# Patient Record
Sex: MALE | Race: BLACK OR AFRICAN AMERICAN | Employment: PART TIME | URBAN - METROPOLITAN AREA
[De-identification: names, ages, dates, MRNs, and addresses within clinical notes are randomized per-mention and may not be internally consistent; named-entity substitution may affect disease eponyms.]

---

## 2017-04-10 ENCOUNTER — OFFICE VISIT (OUTPATIENT)
Dept: FAMILY MEDICINE | Facility: CLINIC | Age: 52
End: 2017-04-10
Payer: COMMERCIAL

## 2017-04-10 ENCOUNTER — TELEPHONE (OUTPATIENT)
Dept: FAMILY MEDICINE | Facility: CLINIC | Age: 52
End: 2017-04-10

## 2017-04-10 VITALS
HEIGHT: 74 IN | TEMPERATURE: 97.3 F | OXYGEN SATURATION: 99 % | HEART RATE: 62 BPM | WEIGHT: 207.5 LBS | RESPIRATION RATE: 16 BRPM | SYSTOLIC BLOOD PRESSURE: 116 MMHG | BODY MASS INDEX: 26.63 KG/M2 | DIASTOLIC BLOOD PRESSURE: 62 MMHG

## 2017-04-10 DIAGNOSIS — G44.201 INTRACTABLE TENSION-TYPE HEADACHE, UNSPECIFIED CHRONICITY PATTERN: Chronic | ICD-10-CM

## 2017-04-10 DIAGNOSIS — M77.11 LATERAL EPICONDYLITIS OF RIGHT ELBOW: ICD-10-CM

## 2017-04-10 DIAGNOSIS — G89.29 CHRONIC BILATERAL LOW BACK PAIN WITH LEFT-SIDED SCIATICA: ICD-10-CM

## 2017-04-10 DIAGNOSIS — L84 CORNS AND CALLOSITIES: Primary | ICD-10-CM

## 2017-04-10 DIAGNOSIS — M54.2 NECK PAIN: ICD-10-CM

## 2017-04-10 DIAGNOSIS — M17.0 PRIMARY OSTEOARTHRITIS OF BOTH KNEES: ICD-10-CM

## 2017-04-10 DIAGNOSIS — M54.42 CHRONIC BILATERAL LOW BACK PAIN WITH LEFT-SIDED SCIATICA: ICD-10-CM

## 2017-04-10 PROCEDURE — 99214 OFFICE O/P EST MOD 30 MIN: CPT | Performed by: FAMILY MEDICINE

## 2017-04-10 RX ORDER — SUMATRIPTAN 100 MG/1
100 TABLET, FILM COATED ORAL
Qty: 12 TABLET | Refills: 3 | Status: SHIPPED | OUTPATIENT
Start: 2017-04-10 | End: 2019-07-26

## 2017-04-10 RX ORDER — AMITRIPTYLINE HYDROCHLORIDE 10 MG/1
TABLET ORAL
Qty: 90 TABLET | Refills: 3 | Status: SHIPPED | OUTPATIENT
Start: 2017-04-10 | End: 2019-07-26

## 2017-04-10 NOTE — MR AVS SNAPSHOT
After Visit Summary   4/10/2017    Marcell Kat    MRN: [id number]           Patient Information     Date Of Birth          1965        Visit Information        Provider Department      4/10/2017 7:30 AM Laurent Olivas MD [facility]        Today's Diagnoses     Corns and callosities    -  1    Primary osteoarthritis of both knees        Neck pain        Chronic bilateral low back pain with left-sided sciatica        Intractable tension-type headache, unspecified chronicity pattern          Care Instructions    (L84) Corns and callosities  (primary encounter diagnosis)  Comment:    Plan: order for DME             (M17.0) Primary osteoarthritis of both knees  Comment:      Plan:  KNEE EXERCISES        ICE TO KNEE 5 MINUTES PRIOR TO EXERCISE IF POSSIBLE    ISOMETRIC KNEE EXTENDED POSITION STANDING X 30 TWICE DAILY \    FLEXION OF KNEE ISOMETRIC 90 DEGREE FLEXION X 30 TWICE DAILY    WITH FIVE POUND WEIGHTS OR PURSE    SITTING EXTENDED KNEE  X 3O SECONDS TWICE DAILY    STANDING WITH KNEE FLEXED X 30 SECONDS TWICE DAILY    CLOSED CHAIN EXERCISE  ,THAT IS ONE 1-2 INCH BOOK 30 REPS ON AND OFF THE BOOK OR PLATFORM     AFTER 2 WEEK INCREASE TO 3 INCHES    AFTER 4 WEEKS INCREASE TO 4 INCHES    WALL SITTING 30 SECONDS 45 DEGREES    AFTER 2 WEEKS 30 SECONDS 60 DEGREES    AFTER  4 WEEKS 30 SECONDS 90 DEGREES    BALANCE 30 SECONDS WITH OPPOSITE LEG AT 30 DEGREES AND ARM TO SIDE X 2 WEEKS    BALANCE 30 SECONDS WITH OPPOSITE LEG AT 60 DEGREES AND ARM TO SIDE X 2 WEEKS    REPEAT with OPPOSITE LEGS BALANCING       A     (M54.2) Neck pain  Comment:    Plan:      (M54.42,  G89.29) Chronic bilateral low back pain with left-sided sciatica  Comment:    Plan:      (G44.201) Intractable tension-type headache, unspecified chronicity pattern  Comment:    Plan: SUMAtriptan (IMITREX) 100 MG tablet,         amitriptyline (ELAVIL) 10 MG tablet\    Right lateral epicondylitis   Use  tennis elbow strap daily     ICE MASSAGE 5 MINUTES TWICE DAILY AS NEEDED FOR PAIN CONTROL    ISOMETRIC EXERCISES AT 90 DEGREES 30 SECONDS EACH TWICE DAILY    LATERAL FRICTION MASSAGE OR ELBOW GENTLY 30 SECONDS TWICE DAILY    RANGE OF MOTION OF ELBOW FLEXION AND EXTENSION AS TOLERATED    WITH AND WITHOUT WEIGHTS    PALM DOWN ISOMETRIC FOLLOWED BY PALM DOWN RANGE OF MOTION with AND  WITHOUT RESISTANCE 30 EACH TWICE DAILY    THUMB UP ISOMETRICS FOLLOWED BY THUMB UP RANGE OF MOTION WITH AND WITHOUT RESISTANCE TWICE DAILY 30 EACH    PALM DOWN ISOMETRICS FOLLOWED BY PALM DOWN RANGE OF MOTION WITH AND WITHOUT RESISTANCE TWICE DAILY 30 EACH    INTERNAL  AND EXTERNAL ROTATION OF ELBOW AT 90 DEGREES AS TOLERATED 30 EACH TWICE DAILY    WEIGHT ON A STRING WITH BOTH ARMS ON HAND WIND UP ;AND  WIND DOWN SLOWLY 30 SECONDS EACH TWICE DAILY    MASSAGE LIDOCAINE OINTMENT OR DICLOFENAC GEL 1% TWICE DAILY  AS TOLERATED    TENNIS ELBOW BAND AS TOLERATED WHEN WORKING OR IF HAVING SIGNIFICANT PAIN    aspercreme 10% qid as needed     Laurent Olivas Jr., MD                         Follow-ups after your visit        Follow-up notes from your care team     Return in about 4 weeks (around 5/8/2017).      Who to contact     If you have questions or need follow up information about today's clinic visit or your schedule please contact [facility] directly at [phone].  Normal or non-critical lab and imaging results will be communicated to you by MyChart, letter or phone within 4 business days after the clinic has received the results. If you do not hear from us within 7 days, please contact the clinic through MyChart or phone. If you have a critical or abnormal lab result, we will notify you by phone as soon as possible.  Submit refill requests through [facility] or call your pharmacy and they will forward the refill request to us. Please allow 3 business days for your refill to be completed.           "Additional Information About Your Visit        MyChart Information     [employer] gives you secure access to your electronic health record. If you see a primary care provider, you can also send messages to your care team and make appointments. If you have questions, please call your primary care clinic.  If you do not have a primary care provider, please call [phone] and they will assist you.        Care EveryWhere ID     This is your Care EveryWhere ID. This could be used by other organizations to access your [facility] [facility] medical records  [id number]        Your Vitals Were     Pulse Temperature Respirations Height Pulse Oximetry BMI (Body Mass Index)    62 97.3  F (36.3  C) (Tympanic) 16 6' 1.5\" (1.867 m) 99% 27.01 kg/m2       Blood Pressure from Last 3 Encounters:   04/10/17 116/62   10/14/16 124/76   08/25/14 100/70    Weight from Last 3 Encounters:   04/10/17 207 lb 8 oz (94.1 kg)   10/14/16 201 lb 12.8 oz (91.5 kg)   08/25/14 197 lb (89.4 kg)              We Performed the Following     MIGRAINE ACTION PLAN          Today's Medication Changes          These changes are accurate as of: 4/10/17  8:06 AM.  If you have any questions, ask your nurse or doctor.               Start taking these medicines.        Dose/Directions    amitriptyline 10 MG tablet   Commonly known as:  ELAVIL   Used for:  Intractable tension-type headache, unspecified chronicity pattern   Started by:  Laurent Olivas MD        Start at 1 tab at bedtime for 3 days, then 2 tabs at bedtime for 3 days, then 3 tabs at bedtime.  Plan to increase dose to 50-75 mg at bedtime after 1 month   Quantity:  90 tablet   Refills:  3       order for DME   Used for:  Corns and callosities   Started by:  Laurent Olivas MD        Equipment being ordered:  Tennis elbow brace *one Use as needed  Toe separator One Use as needed   Quantity:  2 each   Refills:  3       SUMAtriptan 100 MG tablet   Commonly known as:  IMITREX   Used for:  Intractable " tension-type headache, unspecified chronicity pattern   Started by:  Laurent Olivas MD        Dose:  100 mg   Take 1 tablet (100 mg) by mouth at onset of headache for migraine   Quantity:  12 tablet   Refills:  3            Where to get your medicines      These medications were sent to [facility] [address] [place], [place] - [address]  [address] [address]     Phone:  [phone]     SUMAtriptan 100 MG tablet         Some of these will need a paper prescription and others can be bought over the counter.  Ask your nurse if you have questions.     Bring a paper prescription for each of these medications     amitriptyline 10 MG tablet    order for DME                Primary Care Provider Office Phone # Fax #    Laurent Olivas [phone]       [facility] [address]  [place] [address]        Thank you!     Thank you for choosing [facility]  for your care. Our goal is always to provide you with excellent care. Hearing back from our patients is one way we can continue to improve our services. Please take a few minutes to complete the written survey that you may receive in the mail after your visit with us. Thank you!             Your Updated Medication List - Protect others around you: Learn how to safely use, store and throw away your medicines at www.disposemymeds.org.          This list is accurate as of: 4/10/17  8:06 AM.  Always use your most recent med list.                   Brand Name Dispense Instructions for use    amitriptyline 10 MG tablet    ELAVIL    90 tablet    Start at 1 tab at bedtime for 3 days, then 2 tabs at bedtime for 3 days, then 3 tabs at bedtime.  Plan to increase dose to 50-75 mg at bedtime after 1 month       lidocaine 5 % ointment    XYLOCAINE    142 g    One application 4 x daily to affected areas lower back and knees if necessary       meclizine 25 MG tablet     ANTIVERT    30 tablet    Take 1 tablet (25 mg) by mouth every 6 hours as needed for dizziness       nadolol 20 MG tablet    CORGARD    30 tablet    Take 1 tablet (20 mg) by mouth daily       naproxen sodium 550 MG tablet    ANAPROX    60 tablet    Take 1 tablet (550 mg) by mouth 2 times daily (with meals)       order for DME     2 each    Equipment being ordered:  Tennis elbow brace *one Use as needed  Toe separator One Use as needed       SUMAtriptan 100 MG tablet    IMITREX    12 tablet    Take 1 tablet (100 mg) by mouth at onset of headache for migraine

## 2017-04-10 NOTE — PATIENT INSTRUCTIONS
(L84) Corns and callosities  (primary encounter diagnosis)  Comment:    Plan: order for DME             (M17.0) Primary osteoarthritis of both knees  Comment:      Plan:  KNEE EXERCISES        ICE TO KNEE 5 MINUTES PRIOR TO EXERCISE IF POSSIBLE    ISOMETRIC KNEE EXTENDED POSITION STANDING X 30 TWICE DAILY \    FLEXION OF KNEE ISOMETRIC 90 DEGREE FLEXION X 30 TWICE DAILY    WITH FIVE POUND WEIGHTS OR PURSE    SITTING EXTENDED KNEE  X 3O SECONDS TWICE DAILY    STANDING WITH KNEE FLEXED X 30 SECONDS TWICE DAILY    CLOSED CHAIN EXERCISE  ,THAT IS ONE 1-2 INCH BOOK 30 REPS ON AND OFF THE BOOK OR PLATFORM     AFTER 2 WEEK INCREASE TO 3 INCHES    AFTER 4 WEEKS INCREASE TO 4 INCHES    WALL SITTING 30 SECONDS 45 DEGREES    AFTER 2 WEEKS 30 SECONDS 60 DEGREES    AFTER  4 WEEKS 30 SECONDS 90 DEGREES    BALANCE 30 SECONDS WITH OPPOSITE LEG AT 30 DEGREES AND ARM TO SIDE X 2 WEEKS    BALANCE 30 SECONDS WITH OPPOSITE LEG AT 60 DEGREES AND ARM TO SIDE X 2 WEEKS    REPEAT with OPPOSITE LEGS BALANCING       A     (M54.2) Neck pain  Comment:    Plan:      (M54.42,  G89.29) Chronic bilateral low back pain with left-sided sciatica  Comment:    Plan:      (G44.201) Intractable tension-type headache, unspecified chronicity pattern  Comment:    Plan: SUMAtriptan (IMITREX) 100 MG tablet,         amitriptyline (ELAVIL) 10 MG tablet\    Right lateral epicondylitis   Use tennis elbow strap daily     ICE MASSAGE 5 MINUTES TWICE DAILY AS NEEDED FOR PAIN CONTROL    ISOMETRIC EXERCISES AT 90 DEGREES 30 SECONDS EACH TWICE DAILY    LATERAL FRICTION MASSAGE OR ELBOW GENTLY 30 SECONDS TWICE DAILY    RANGE OF MOTION OF ELBOW FLEXION AND EXTENSION AS TOLERATED    WITH AND WITHOUT WEIGHTS    PALM DOWN ISOMETRIC FOLLOWED BY PALM DOWN RANGE OF MOTION with AND  WITHOUT RESISTANCE 30 EACH TWICE DAILY    THUMB UP ISOMETRICS FOLLOWED BY THUMB UP RANGE OF MOTION WITH AND WITHOUT RESISTANCE TWICE DAILY 30 EACH    PALM DOWN ISOMETRICS FOLLOWED BY PALM DOWN RANGE  OF MOTION WITH AND WITHOUT RESISTANCE TWICE DAILY 30 EACH    INTERNAL  AND EXTERNAL ROTATION OF ELBOW AT 90 DEGREES AS TOLERATED 30 EACH TWICE DAILY    WEIGHT ON A STRING WITH BOTH ARMS ON HAND WIND UP ;AND  WIND DOWN SLOWLY 30 SECONDS EACH TWICE DAILY    MASSAGE LIDOCAINE OINTMENT OR DICLOFENAC GEL 1% TWICE DAILY  AS TOLERATED    TENNIS ELBOW BAND AS TOLERATED WHEN WORKING OR IF HAVING SIGNIFICANT PAIN    aspercreme 10% qid as needed     Laurent Olivas Jr., MD

## 2017-04-10 NOTE — NURSING NOTE
"Chief Complaint   Patient presents with     Fungal Infection     right foot     Elbow Pain       Initial /62 (BP Location: Left arm, Cuff Size: Adult Large)  Pulse 62  Temp 97.3  F (36.3  C) (Tympanic)  Resp 16  Ht 6' 1.5\" (1.867 m)  Wt 207 lb 8 oz (94.1 kg)  SpO2 99%  BMI 27.01 kg/m2 Estimated body mass index is 27.01 kg/(m^2) as calculated from the following:    Height as of this encounter: 6' 1.5\" (1.867 m).    Weight as of this encounter: 207 lb 8 oz (94.1 kg).  Medication Reconciliation: complete   Lizeth Perez CMA    "

## 2017-04-10 NOTE — PROGRESS NOTES
SUBJECTIVE:                                                    Marcell Kat is a 52 year old male who presents to clinic today for the following health issues:  Health Maintenance Due   Topic Date Due     MIGRAINE ACTION PLAN,ONE TIME,MILVIA GAY  01/01[birth year]1983     Health Maintenance reviewed at today's visit patient asked to schedule/complete:   Colon Cancer:  Patient agrees to schedule      Toes  Soft callosity   4th to 5th toe   Antifungal       Duration: many years    Description (location/character/radiation): fungus between toes    Intensity:  moderate    Accompanying signs and symptoms: itchy, white, odor    History (similar episodes/previous evaluation): yes    Precipitating or alleviating factors: None    Therapies tried and outcome: None       Right elbow painn right lateral epicondyle  Tennis        Duration: 3 months    Description (location/character/radiation): right elbow    Intensity:  Moderate, when grabbing, also at night when sleeping on that arm    Accompanying signs and symptoms: na    History (similar episodes/previous evaluation): None    Precipitating or alleviating factors: None    Therapies tried and outcome: None     Migraine headache   Did not work well for him    .  Current Outpatient Prescriptions   Medication Sig Dispense Refill     SUMAtriptan (IMITREX) 100 MG tablet Take 1 tablet (100 mg) by mouth at onset of headache for migraine 12 tablet 3     amitriptyline (ELAVIL) 10 MG tablet Start at 1 tab at bedtime for 3 days, then 2 tabs at bedtime for 3 days, then 3 tabs at bedtime.  Plan to increase dose to 50-75 mg at bedtime after 1 month 90 tablet 3     order for DME Equipment being ordered:  Tennis elbow brace  *one  Use as needed    Toe separator  One  Use as needed 2 each 3     lidocaine (XYLOCAINE) 5 % ointment One application 4 x daily to affected areas lower back and knees if necessary (Patient not taking: Reported on 4/10/2017) 142 g 11     nadolol (CORGARD) 20 MG tablet Take 1  tablet (20 mg) by mouth daily (Patient not taking: Reported on 4/10/2017) 30 tablet 11     meclizine (ANTIVERT) 25 MG tablet Take 1 tablet (25 mg) by mouth every 6 hours as needed for dizziness (Patient not taking: Reported on 4/10/2017) 30 tablet 1     naproxen sodium (ANAPROX) 550 MG tablet Take 1 tablet (550 mg) by mouth 2 times daily (with meals) (Patient not taking: Reported on 4/10/2017) 60 tablet 1        No Known Allergies    Immunization History   Administered Date(s) Administered     TD (ADULT, 7+) 2008         reports that he does not drink alcohol.      reports that he does not use illicit drugs.    family history includes DIABETES in his brother; Genitourinary Problems in his mother.    indicated that his mother is . He indicated that his father is alive. He indicated that his brother is alive.      has no past surgical history on file.     reports that he currently engages in sexual activity and has had female partners.  .  Pediatric History   Patient Guardian Status     Not on file.     Other Topics Concern     Parent/Sibling W/ Cabg, Mi Or Angioplasty Before 65f 55m? No     Social History Narrative         reports that he has never smoked. He has never used smokeless tobacco.    Medical, social, surgical, and family histories reviewed.    Problem list, Medication list, Allergies, and Medical/Social/Surgical histories reviewed in [facility] and updated as appropriate.  Labs reviewed in EPIC  Patient Active Problem List   Diagnosis     Vitamin D insufficiency     Knee pain, right     Pain in right testicle     Headache     Male infertility     Special screening for malignant neoplasm of prostate     Hyperlipidemia LDL goal <100     Lateral epicondylitis of right elbow     History reviewed. No pertinent surgical history.    Social History   Substance Use Topics     Smoking status: Never Smoker     Smokeless tobacco: Never Used     Alcohol use No     Family History   Problem Relation Age of Onset      Genitourinary Problems Mother      DIABETES Brother          No Known Allergies  Recent Labs   Lab Test  10/14/16   0921  08/15/14   1949  05/29/14   1701  09/05/13   1013   LDL  154*   --    --   112   HDL  54   --    --   37*   TRIG  120   --    --   155*   ALT  43  35  54  100*   CR  0.97  0.98   --   1.00   GFRESTIMATED  82  82   --   80   GFRESTBLACK  >90  >90  African American GFR Calc     --   >90   POTASSIUM  4.1  4.0   --   4.0   TSH   --    --    --   2.33        BP Readings from Last 6 Encounters:   04/10/17 116/62   10/14/16 124/76   08/25/14 100/70   08/15/14 (!) 122/92   05/29/14 112/70   09/05/13 104/80       Wt Readings from Last 3 Encounters:   04/10/17 207 lb 8 oz (94.1 kg)   10/14/16 201 lb 12.8 oz (91.5 kg)   08/25/14 197 lb (89.4 kg)         Positive symptoms or findings indicated by bold designation:     ROS: 10 point ROS neg other than the symptoms noted above in the HPI.except  has Vitamin D insufficiency; Knee pain, right; Pain in right testicle; Headache; Male infertility; Special screening for malignant neoplasm of prostate; and Hyperlipidemia LDL goal <100 on his problem list.   Constitutional: The patient denied fatigue, fever, insomnia, night sweats, recent illness and weight loss.  Weight is stable     Eyes: The patient denied blindness, eye pain, eye tearing, photophobia, vision change and visual disturbance. Normal vision       Ears/Nose/Throat/Neck: The patient denied dizziness, facial pain, hearing loss, nasal discharge, oral pain, otalgia, postnasal drip, sinus congestion, sore throat, tinnitus and voice change.   Migraine and tension headaches  And fertility     Cardiovascular: The patient denied arrhythmia, chest pain/pressure, claudication, edema, exercise intolerance, fatigue, orthopnea, palpitations and syncope.      Respiratory: The patient denied asthma, chest congestion, cough, dyspnea on exertion, dyspnea/shortness of breath, hemoptysis, pedal edema, pleuritic pain,  "productive sputum, snoring and wheezing.     Gastrointestinal: The patient denied abdominal pain, anorexia, constipation, diarrhea, dysphagia, gastroesophageal reflux, hematochezia, hemorrhoids, melena, nausea and vomiting . GASTROESOPHAGEAL REFLUX DISEASE     Genitourinary/Nephrology: The patient denied breast complaint, dysuria, nocturia sexual dysfunction, t, urinary frequency, urinary incontinence, urinary urgency        Musculoskeletal: The patient denied arthralgia(s), back pain, joint complaint, muscle weakness, myalgias, osteoporosis, sciatica, stiffness and swelling.  RIGHT LATERAL EPICONDYLE     Dermatoligic:: The patient denied acne, dermatitis, ecchymosis, itching, mole change, rash, skin cancer, skin lesion and sores.      Neurologic: The patient denied dizziness, gait abnormality, headache, memory loss, mental status change, paresis, paresthesia, seizure, syncope, tremor and vision change.       Psychiatric: The patient denied anxiety, depression, disturbances of memory, drug abuse, insomnia, mood swings and relationship difficulties.      Endocrine: The patient denied , goiter, obesity, polyuria and thyroid disease.      Hematologic/Lymphatic: The patient denied abnormal bleeding and bruising, abnormal ecchymoses, anemia, lymph node enlargement/mass, petechiae and venous  Thrombosis.      Allergy/Immunology: The patient denied food allergy and  Allergic rhinitis or conjunctivitis.        PE:  /62 (BP Location: Left arm, Cuff Size: Adult Large)  Pulse 62  Temp 97.3  F (36.3  C) (Tympanic)  Resp 16  Ht 6' 1.5\" (1.867 m)  Wt 207 lb 8 oz (94.1 kg)  SpO2 99%  BMI 27.01 kg/m2 Body mass index is 27.01 kg/(m^2).    Constitutional: general appearance, well nourished, well developed, in no acute distress, well developed, appears stated age, normal body habitus,      Eyes:; The patient has normal eyelids sclerae and conjunctivae :      Ears/Nose/Throat: external ear, overall: normal appearance; " external nose, overall: benign appearance, normal moujth gums and lips  The patient has:      Neck: thyroid, overall: normal size, normal consistency, nontender,      Respiratory:  palpation of chest, overall: normal excursion,    Clear to percussion and auscultation      Tachypnea   Color  NORMAL      Cardiovascular:  Good color with no peripheral edema  NORMAL    Regular sinus rhythm without murmur. Physiologic heart sounds Heart is unelarged  .   Chest/Breast: normal shape  NORMAL       Abdominal exam,  Liver and spleen are  unenlarged  NORMAL         Tenderness NORMAL     Scars NORMAL      Urogenital; no renal, flank or bladder  tenderness;  NORMAL       Lymphatic: neck nodes, NORMAL      Other nodes      Musculoskeletal:  Brief ortho exam normal except:   RIGHT LATERAL TENDERNESS   FULL RANGE OF MOTION RIGHT ELBOW   LEFT ELBOW with NORMAL   NECK FULL RANGE OF MOTION   LOWER BACK PAIN WITHIN NORMAL LIMITS AND IMPROVED   MIGRAINE VS TENSION  HEADACHES      Integument: inspection of skin, no rash, lesions; and, palpation, no induration, no tenderness.      Neurologic mental status, overall: alert and oriented; gait, no ataxia, no unsteadiness; coordination, no tremors; cranial nerves, overall: normal motor, overall: normal bulk, tone.      Psychiatric: orientation/consciousness, overall: oriented to person, place and time; behavior/psychomotor activity, no tics, normal psychomotor activity; mood and affect, overall: normal mood and affect; appearance, overall: well-groomed, good eye contact; speech, overall: normal quality, no aphasia and normal quality, quantity, intact.      Diagnostic Test Results:  Results for orders placed or performed in visit on 11/02/16   Fecal colorectal cancer screen FIT   Result Value Ref Range    Occult Blood Scn FIT Negative NEG         ICD-10-CM    1. Corns and callosities L84 order for DME   2. Primary osteoarthritis of both knees M17.0    3. Neck pain M54.2    4. Chronic bilateral  low back pain with left-sided sciatica M54.42     G89.29    5. Intractable tension-type headache, unspecified chronicity pattern G44.201 SUMAtriptan (IMITREX) 100 MG tablet     amitriptyline (ELAVIL) 10 MG tablet   6. Lateral epicondylitis of right elbow M77.11         .    Side effects benefits and risks thoroughly discussed. .he may come in early if unimproved or getting worse          Importance of adhering to regimen discussed and if medications were dispensed, the importance of taking medications discussed and bringing in the medications after every visit for chronic problems         Please drink 2 glasses of water prior to meals and walk 15-30 minutes after meals    I spent  25 MINUTES SPENT  with patient discussing the following issues   The primary encounter diagnosis was Corns and callosities. Diagnoses of Primary osteoarthritis of both knees, Neck pain, Chronic bilateral low back pain with left-sided sciatica, Intractable tension-type headache, unspecified chronicity pattern, and Lateral epicondylitis of right elbow were also pertinent to this visit. over half of which involved counseling and coordination of care.    Patient Instructions   (L84) Corns and callosities  (primary encounter diagnosis)  Comment:    Plan: order for DME             (M17.0) Primary osteoarthritis of both knees  Comment:      Plan:  KNEE EXERCISES        ICE TO KNEE 5 MINUTES PRIOR TO EXERCISE IF POSSIBLE    ISOMETRIC KNEE EXTENDED POSITION STANDING X 30 TWICE DAILY \    FLEXION OF KNEE ISOMETRIC 90 DEGREE FLEXION X 30 TWICE DAILY    WITH FIVE POUND WEIGHTS OR PURSE    SITTING EXTENDED KNEE  X 3O SECONDS TWICE DAILY    STANDING WITH KNEE FLEXED X 30 SECONDS TWICE DAILY    CLOSED CHAIN EXERCISE  ,THAT IS ONE 1-2 INCH BOOK 30 REPS ON AND OFF THE BOOK OR PLATFORM     AFTER 2 WEEK INCREASE TO 3 INCHES    AFTER 4 WEEKS INCREASE TO 4 INCHES    WALL SITTING 30 SECONDS 45 DEGREES    AFTER 2 WEEKS 30 SECONDS 60 DEGREES    AFTER  4 WEEKS 30  SECONDS 90 DEGREES    BALANCE 30 SECONDS WITH OPPOSITE LEG AT 30 DEGREES AND ARM TO SIDE X 2 WEEKS    BALANCE 30 SECONDS WITH OPPOSITE LEG AT 60 DEGREES AND ARM TO SIDE X 2 WEEKS    REPEAT with OPPOSITE LEGS BALANCING       A     (M54.2) Neck pain  Comment:    Plan:      (M54.42,  G89.29) Chronic bilateral low back pain with left-sided sciatica  Comment:    Plan:      (G44.201) Intractable tension-type headache, unspecified chronicity pattern  Comment:    Plan: SUMAtriptan (IMITREX) 100 MG tablet,         amitriptyline (ELAVIL) 10 MG tablet\    Right lateral epicondylitis   Use tennis elbow strap daily     ICE MASSAGE 5 MINUTES TWICE DAILY AS NEEDED FOR PAIN CONTROL    ISOMETRIC EXERCISES AT 90 DEGREES 30 SECONDS EACH TWICE DAILY    LATERAL FRICTION MASSAGE OR ELBOW GENTLY 30 SECONDS TWICE DAILY    RANGE OF MOTION OF ELBOW FLEXION AND EXTENSION AS TOLERATED    WITH AND WITHOUT WEIGHTS    PALM DOWN ISOMETRIC FOLLOWED BY PALM DOWN RANGE OF MOTION with AND  WITHOUT RESISTANCE 30 EACH TWICE DAILY    THUMB UP ISOMETRICS FOLLOWED BY THUMB UP RANGE OF MOTION WITH AND WITHOUT RESISTANCE TWICE DAILY 30 EACH    PALM DOWN ISOMETRICS FOLLOWED BY PALM DOWN RANGE OF MOTION WITH AND WITHOUT RESISTANCE TWICE DAILY 30 EACH    INTERNAL  AND EXTERNAL ROTATION OF ELBOW AT 90 DEGREES AS TOLERATED 30 EACH TWICE DAILY    WEIGHT ON A STRING WITH BOTH ARMS ON HAND WIND UP ;AND  WIND DOWN SLOWLY 30 SECONDS EACH TWICE DAILY    MASSAGE LIDOCAINE OINTMENT OR DICLOFENAC GEL 1% TWICE DAILY  AS TOLERATED    TENNIS ELBOW BAND AS TOLERATED WHEN WORKING OR IF HAVING SIGNIFICANT PAIN    aspercreme 10% qid as needed     Renny Jin Jr., MD                       Diet:  MEDITERRANEAN DIET     Exercise:    Exercises Range of motion, balance, isometric, and strengthening exercises 30 repetitions twice daily of involved joints      .RENNY JIN MD 4/10/2017 7:47 AM  April 10, 2017

## 2017-04-10 NOTE — LETTER
My Migraine Action Plan      Date: 4/10/2017     My Name: Marcell Kat   YOB: 1965  My Pharmacy: [id number] [place] [facility] - [place], [place] - [address]       My (Preventative) Control Medicine: ***        My Rescue Medicine: ***   My Doctor: Laurent Olivas     My Clinic: [facility]  [address]  [address]  [place] [address] [phone]        GREEN ZONE = Good Control    My headache plan is working.   I can do what I need to do.           I WILL:     ? Keep managing my triggers.  ? Write in my migraine diary each time I have a headache.  ? Keep taking my preventive (controller) medicine daily.  ? Take my relief and rescue medicine as needed.             YELLOW ZONE = Not Enough Control    My headache plan isn t always working.   My headaches keep me from doing   some of the things I need to do.       I WILL:     ? Set goals to control my triggers and act on them.  ? Write in my migraine diary each time I have a headache and review it for                      patterns or new triggers.  ? Keep taking my preventive (controller) medicine daily.  ? Take my relief and rescue medicine as needed.  ? Call my doctor or clinic at if I stay in the Yellow Zone.             RED ZONE = Poor or No Control    My headache plan has  failed. I can t do anything  when I have one. My  medicines aren t working.           I WILL:   ? Set goals to control my triggers and act on them.  ? Write in my migraine diary each time I have a headache and review it for                      patterns or new triggers.  ? Keep taking my preventive (controller) medicine daily.  ? Take my relief and rescue medicine as needed.  ? Call my doctor or clinic or go to urgent care or an ER if I m having the worst                  headache of my life.  ? Call my doctor or clinic or go to urgent care or an ER if my medicine doesn t work.  ? Let my doctor or clinic know within 2 weeks if  I have gone to an urgent care or             emergency department.          Provider specific instructions:  ***

## 2017-04-10 NOTE — TELEPHONE ENCOUNTER
[facility]. Patient came in with Imitrex signed and Elavil not signed.   Verbal given for Elavil as in EPIC. FYI sent to provider.     amitriptyline (ELAVIL) 10 MG tablet 90 tablet 3 4/10/2017  --   Sig: Start at 1 tab at bedtime for 3 days, then 2 tabs at bedtime for 3 days, then 3 tabs at bedtime.  Plan to increase dose to 50-75 mg at bedtime after 1 month

## 2017-04-11 NOTE — TELEPHONE ENCOUNTER
START WITH 10MG AT HOUR OF SLEEP  MAY INCREASE TO 20MG SLOWLY  DO NOT GO ANY HIGHER AT THIS TIME  WATCH FOR DRY MOUTH BLURRED VISION AND CONSTIPATION   RENNY JIN JR., MD

## 2019-07-26 ENCOUNTER — OFFICE VISIT (OUTPATIENT)
Dept: FAMILY MEDICINE | Facility: CLINIC | Age: 54
End: 2019-07-26
Payer: COMMERCIAL

## 2019-07-26 VITALS
DIASTOLIC BLOOD PRESSURE: 60 MMHG | SYSTOLIC BLOOD PRESSURE: 112 MMHG | OXYGEN SATURATION: 99 % | HEIGHT: 74 IN | HEART RATE: 62 BPM | RESPIRATION RATE: 16 BRPM | BODY MASS INDEX: 26.18 KG/M2 | WEIGHT: 204 LBS | TEMPERATURE: 96 F

## 2019-07-26 DIAGNOSIS — G89.29 CHRONIC BILATERAL LOW BACK PAIN WITH LEFT-SIDED SCIATICA: ICD-10-CM

## 2019-07-26 DIAGNOSIS — Z12.5 SCREENING PSA (PROSTATE SPECIFIC ANTIGEN): ICD-10-CM

## 2019-07-26 DIAGNOSIS — M54.2 NECK PAIN: ICD-10-CM

## 2019-07-26 DIAGNOSIS — L84 CORNS AND CALLOSITIES: ICD-10-CM

## 2019-07-26 DIAGNOSIS — G44.201 INTRACTABLE TENSION-TYPE HEADACHE, UNSPECIFIED CHRONICITY PATTERN: Chronic | ICD-10-CM

## 2019-07-26 DIAGNOSIS — R30.0 DYSURIA: ICD-10-CM

## 2019-07-26 DIAGNOSIS — E78.5 HYPERLIPIDEMIA LDL GOAL <100: ICD-10-CM

## 2019-07-26 DIAGNOSIS — M17.0 PRIMARY OSTEOARTHRITIS OF BOTH KNEES: ICD-10-CM

## 2019-07-26 DIAGNOSIS — M54.42 CHRONIC BILATERAL LOW BACK PAIN WITH LEFT-SIDED SCIATICA: ICD-10-CM

## 2019-07-26 DIAGNOSIS — B35.3 TINEA PEDIS OF RIGHT FOOT: ICD-10-CM

## 2019-07-26 DIAGNOSIS — Z00.00 ROUTINE GENERAL MEDICAL EXAMINATION AT A HEALTH CARE FACILITY: Primary | ICD-10-CM

## 2019-07-26 LAB
ALBUMIN UR-MCNC: NEGATIVE MG/DL
ALT SERPL W P-5'-P-CCNC: 35 U/L (ref 0–70)
ANION GAP SERPL CALCULATED.3IONS-SCNC: 8 MMOL/L (ref 3–14)
APPEARANCE UR: CLEAR
BILIRUB UR QL STRIP: NEGATIVE
BUN SERPL-MCNC: 13 MG/DL (ref 7–30)
CALCIUM SERPL-MCNC: 9 MG/DL (ref 8.5–10.1)
CHLORIDE SERPL-SCNC: 108 MMOL/L (ref 94–109)
CHOLEST SERPL-MCNC: 197 MG/DL
CO2 SERPL-SCNC: 25 MMOL/L (ref 20–32)
COLOR UR AUTO: YELLOW
CREAT SERPL-MCNC: 0.88 MG/DL (ref 0.66–1.25)
GFR SERPL CREATININE-BSD FRML MDRD: >90 ML/MIN/{1.73_M2}
GLUCOSE SERPL-MCNC: 113 MG/DL (ref 70–99)
GLUCOSE UR STRIP-MCNC: NEGATIVE MG/DL
HDLC SERPL-MCNC: 47 MG/DL
HGB UR QL STRIP: NEGATIVE
KETONES UR STRIP-MCNC: NEGATIVE MG/DL
LDLC SERPL CALC-MCNC: 111 MG/DL
LEUKOCYTE ESTERASE UR QL STRIP: NEGATIVE
NITRATE UR QL: NEGATIVE
NONHDLC SERPL-MCNC: 150 MG/DL
PH UR STRIP: 7.5 PH (ref 5–7)
POTASSIUM SERPL-SCNC: 3.7 MMOL/L (ref 3.4–5.3)
PSA SERPL-ACNC: 2.85 UG/L (ref 0–4)
SODIUM SERPL-SCNC: 141 MMOL/L (ref 133–144)
SOURCE: ABNORMAL
SP GR UR STRIP: 1.01 (ref 1–1.03)
TRIGL SERPL-MCNC: 194 MG/DL
UROBILINOGEN UR STRIP-ACNC: 0.2 EU/DL (ref 0.2–1)

## 2019-07-26 PROCEDURE — 80061 LIPID PANEL: CPT | Performed by: FAMILY MEDICINE

## 2019-07-26 PROCEDURE — G0103 PSA SCREENING: HCPCS | Performed by: FAMILY MEDICINE

## 2019-07-26 PROCEDURE — 80048 BASIC METABOLIC PNL TOTAL CA: CPT | Performed by: FAMILY MEDICINE

## 2019-07-26 PROCEDURE — 99396 PREV VISIT EST AGE 40-64: CPT | Performed by: FAMILY MEDICINE

## 2019-07-26 PROCEDURE — 84460 ALANINE AMINO (ALT) (SGPT): CPT | Performed by: FAMILY MEDICINE

## 2019-07-26 PROCEDURE — 81003 URINALYSIS AUTO W/O SCOPE: CPT | Performed by: FAMILY MEDICINE

## 2019-07-26 PROCEDURE — 36415 COLL VENOUS BLD VENIPUNCTURE: CPT | Performed by: FAMILY MEDICINE

## 2019-07-26 RX ORDER — CLOTRIMAZOLE 1 %
CREAM (GRAM) TOPICAL 2 TIMES DAILY
Qty: 15 G | Refills: 3 | Status: SHIPPED | OUTPATIENT
Start: 2019-07-26 | End: 2019-07-26

## 2019-07-26 ASSESSMENT — MIFFLIN-ST. JEOR: SCORE: 1827.15

## 2019-07-26 NOTE — PATIENT INSTRUCTIONS
"  Preventive Health Recommendations  Male Ages 50 - 64    Yearly exam:             See your health care provider every year in order to  o   Review health changes.   o   Discuss preventive care.    o   Review your medicines if your doctor has prescribed any.     Have a cholesterol test every 5 years, or more frequently if you are at risk for high cholesterol/heart disease.     Have a diabetes test (fasting glucose) every three years. If you are at risk for diabetes, you should have this test more often.     Have a colonoscopy at age 50, or have a yearly FIT test (stool test). These exams will check for colon cancer.      Talk with your health care provider about whether or not a prostate cancer screening test (PSA) is right for you.    You should be tested each year for STDs (sexually transmitted diseases), if you re at risk.     Shots: Get a flu shot each year. Get a tetanus shot every 10 years.     Nutrition:    Eat at least 5 servings of fruits and vegetables daily.     Eat whole-grain bread, whole-wheat pasta and brown rice instead of white grains and rice.     Get adequate Calcium and Vitamin D.     Lifestyle    Exercise for at least 150 minutes a week (30 minutes a day, 5 days a week). This will help you control your weight and prevent disease.     Limit alcohol to one drink per day.     No smoking.     Wear sunscreen to prevent skin cancer.     See your dentist every six months for an exam and cleaning.     See your eye doctor every 1 to 2 years.     What You Can Do to Reduce Cholesterol Levels  and Reduce Risk of Diabetes, Heart, and Blood Vessel Disease  1. Eat six to eight servings of green or root vegetables or fruit (raw or cooked) per day. You need 35 grams of fiber per day.  Examples: carrots apples  broccoli oranges  spinach grapefruit  lettuce pears  bananas  2. Use up to 2 cup of walnuts, almonds, or pecans as a source of \"good\" fat per day.  3. Drink one to three servings of soy milk (great for " "cereal) or 2 cup of soynuts per day.  4. Use margarine with reduced trans or saturated fats (e.g. Benecol, Smart Balance, olive oil margarine) as replacement for butter or margarine.  5. Eat fewer or half-portions of desserts, baked goods, chips, cookies, processed flour and sugar, pasta, butter, cream, non-skim dairy, ice cream.  6. Use olive or canola oil as the only oils for cooking and dressings.  7. Use one tablespoon of ground flaxseed or Natural Ovens \"Energy Mix\" per day (great on cereal or over salad).  8. Eat one to two servings per week of wild salmon or water-packed tuna.  9. Limit beef, lamb, and pork to at most one serving per day. (Range-fed beef, if you can get it, is much preferred and allows for greater use in diet).  10. Eat three to four servings per day of whole grains (legumes, rice, wheat, oats).  11. Limit sodas and beer at most to three per week (only special occasions).  12. 65 percent of us need to lose weight and all of us need to keep moving! You should be walking at least 150 minutes per week. You should lose approximately seven percent of your weight in the next year if overweight. Check with me for more details.  1. Andrew Weil's paperback book, The Healthy Kitchen, is a great addition to your healthy lifestyle library.  2. American Diabetic Association (www.diabetes.org) - a wealth of information on nutritional excellence.  3. Other great websites for Mediterranean diets are available.    KNEE EXERCISES        ICE TO KNEE 5 MINUTES PRIOR TO EXERCISE IF POSSIBLE    ISOMETRIC KNEE EXTENDED POSITION STANDING X 30 TWICE DAILY \    FLEXION OF KNEE ISOMETRIC 90 DEGREE FLEXION X 30 TWICE DAILY    WITH FIVE POUND WEIGHTS OR PURSE    SITTING EXTENDED KNEE  X 3O SECONDS TWICE DAILY    STANDING WITH KNEE FLEXED X 30 SECONDS TWICE DAILY    CLOSED CHAIN EXERCISE  ,THAT IS ONE 1-2 INCH BOOK 30 REPS ON AND OFF THE BOOK OR PLATFORM     AFTER 2 WEEK INCREASE TO 3 INCHES    AFTER 4 WEEKS INCREASE TO 4 " INCHES    WALL SITTING 30 SECONDS 45 DEGREES    AFTER 2 WEEKS 30 SECONDS 60 DEGREES    AFTER  4 WEEKS 30 SECONDS 90 DEGREES    BALANCE 30 SECONDS WITH OPPOSITE LEG AT 30 DEGREES AND ARM TO SIDE X 2 WEEKS    BALANCE 30 SECONDS WITH OPPOSITE LEG AT 60 DEGREES AND ARM TO SIDE X 2 WEEKS    REPEAT with OPPOSITE LEGS BALANCING     Assessment: Lower back pain    Plan: do these exercises at least twice daily:  Standing or sitting exercise can be done every two hours    Alpesh' Flexion Versus Frederick   Extension Exercises For   Low Back Pain   Examples of Alpesh' Flexion Exercises  1. Pelvic tilt.  Please press the small of your back against the floor.  Start with 5-10  and increase to 100 count over one month   2. Single Knee to chest. Lie on your back with legs in bent position. Alternate one leg and the other very slowly bringing the knee to chest.  Start with a count of 5-10   Over one month work up to 100  3. Double knee to chest.  Lie on your back with knees in bent position.  Bring both knees to the chest slowly hold for a count of 5-to 10. Over one month work to 100  4. Partial sit-up or crunch.  Lie on your back in bent leg position.  Please bring your body with arms crossed in front  To 30 degrees of flexion. Start with 5-10 over one month work up to 100 or more  5. Sit back.  Please sit on the side of the bed or a stair landing  And lie backwards until the abdominal muscles start to quiver.  Hold for a count of 5-10 and over a month work up to 100.  5. Hamstring stretch.  Please extend your legs while sitting on the floor as tolerated for 5-10 count.  Gradually increase to count of 30 over one month      Alternately find a stair landing or sturdy chair and place heel  In a comfortable level of extension  And stretch one hamstring at a time for 5-10 seconds.  Increase to count of 30 over one month                         Squat.  Stand with legs comfortably apart and lower the body slowly by flexing the knees   for count of 5-10 over one month increase to 30.  Useful for anterior disc protrusion, facette joint arthritis spond-10ylolysis, spondylolisthesis and spinal stenosis    Ruma method or extension exercises  Useful disc bulging posteriorly  1. Prone pressups  Please lie on your abdomen.   Please do a push with the upper half of your body only.  This should not cause the pain to shoot down your buttock thigh leg or foot  Start with 10 and repeat x 3 one minute apart.  Repeat x 10 every 1-2 hours  Pain tends to increase in the center of back  And leaves buttock thighs legs and foot over time  You may shift your pelvis in opposite direction of buttock and leg pain to achieve even better results  2. Superman with arms extended  Or at the side Simultaneously lift your arms and legs off the floor. Start with 5-10 over one month increase to 100.  3. Cat stretch or cobra Start  As if in extended position of prone pressup but hold the stretch for 5 or 10 count. Over one moth to count of 30.  4.  Extensions can be done in standing position  As well if prone pressup is inconvenient.  Shift your pelvis in opposite direction of limb pain.  Put your hands  Flat on your buttocks and lean backwards without loss of balance.  Count 10 x 3 times then 10 extensions hourly     (Z00.00) Routine general medical examination at a health care facility  (primary encounter diagnosis)  Comment:    Plan: Basic metabolic panel             (B35.3) Tinea pedis of right foot  Comment:    Plan: order for DME, clotrimazole (LOTRIMIN) 1 %         external cream             (E78.5) Hyperlipidemia LDL goal <100  Comment:    Plan: Lipid panel reflex to direct LDL Fasting, ALT             (R30.0) Dysuria  Comment:     Plan: UA reflex to Microscopic and Culture             (Z12.5) Screening PSA (prostate specific antigen)  Comment:    Plan: Prostate spec antigen screen  CUT BACK ON TEA  DECREASE TEA TO ONE PER DAY              (M17.0) Primary  osteoarthritis of both knees  Comment:    Plan: diclofenac (VOLTAREN) 1 % topical gel

## 2019-07-26 NOTE — LETTER
July 29, 2019      Marcell Kat  [address] [place] [place] [address]        Dear ,    We are writing to inform you of your test results.        Resulted Orders   Lipid panel reflex to direct LDL Fasting   Result Value Ref Range    Cholesterol 197 <200 mg/dL    Triglycerides 194 (H) <150 mg/dL      Comment:      Borderline high:  150-199 mg/dl  High:             200-499 mg/dl  Very high:       >499 mg/dl      HDL Cholesterol 47 >39 mg/dL    LDL Cholesterol Calculated 111 (H) <100 mg/dL      Comment:      Above desirable:  100-129 mg/dl  Borderline High:  130-159 mg/dL  High:             160-189 mg/dL  Very high:       >189 mg/dl      Non HDL Cholesterol 150 (H) <130 mg/dL      Comment:      Above Desirable:  130-159 mg/dl  Borderline high:  160-189 mg/dl  High:             190-219 mg/dl  Very high:       >219 mg/dl     Basic metabolic panel   Result Value Ref Range    Sodium 141 133 - 144 mmol/L    Potassium 3.7 3.4 - 5.3 mmol/L    Chloride 108 94 - 109 mmol/L    Carbon Dioxide 25 20 - 32 mmol/L    Anion Gap 8 3 - 14 mmol/L    Glucose 113 (H) 70 - 99 mg/dL    Urea Nitrogen 13 7 - 30 mg/dL    Creatinine 0.88 0.66 - 1.25 mg/dL    GFR Estimate >90 >60 mL/min/[1.73_m2]      Comment:      Non  GFR Calc  Starting 12/18/2018, serum creatinine based estimated GFR (eGFR) will be   calculated using the Chronic Kidney Disease Epidemiology Collaboration   (CKD-EPI) equation.      GFR Estimate If Black >90 >60 mL/min/[1.73_m2]      Comment:       GFR Calc  Starting 12/18/2018, serum creatinine based estimated GFR (eGFR) will be   calculated using the Chronic Kidney Disease Epidemiology Collaboration   (CKD-EPI) equation.      Calcium 9.0 8.5 - 10.1 mg/dL   ALT   Result Value Ref Range    ALT 35 0 - 70 U/L   UA reflex to Microscopic and Culture   Result Value Ref Range    Color Urine Yellow     Appearance Urine Clear     Glucose Urine Negative NEG^Negative mg/dL    Bilirubin Urine  Negative NEG^Negative    Ketones Urine Negative NEG^Negative mg/dL    Specific Gravity Urine 1.015 1.003 - 1.035    Blood Urine Negative NEG^Negative    pH Urine 7.5 (H) 5.0 - 7.0 pH    Protein Albumin Urine Negative NEG^Negative mg/dL    Urobilinogen Urine 0.2 0.2 - 1.0 EU/dL    Nitrite Urine Negative NEG^Negative    Leukocyte Esterase Urine Negative NEG^Negative    Source Midstream Urine    Prostate spec antigen screen   Result Value Ref Range    PSA 2.85 0 - 4 ug/L      Comment:      Assay Method:  Chemiluminescence using Siemens Vista analyzer       If you have any questions or concerns, please call the clinic at the number listed above.       Sincerely,        RENNY JIN MD

## 2019-07-26 NOTE — PROGRESS NOTES
"SUBJECTIVE:   CC: Marcell Kat is an 54 year old male who presents for preventative health visit.     HPI  {Add if <65 person on Medicare  - Required Questions (Optional):648873}  {Outside tests to abstract? :706238}    {additional problems to add (Optional):288091}    Today's PHQ-2 Score:   PHQ-2 ( 1999 Pfizer) 10/14/2016   Q1: Little interest or pleasure in doing things 0   Q2: Feeling down, depressed or hopeless 0   PHQ-2 Score 0       Abuse: Current or Past(Physical, Sexual or Emotional)- { :341237}  Do you feel safe in your environment? { :824487}    Social History     Tobacco Use     Smoking status: Never Smoker     Smokeless tobacco: Never Used   Substance Use Topics     Alcohol use: No     {Rooming Staff- Complete this question if Prescreen response is not shown below for today's visit. If you drink alcohol do you typically have >3 drinks per day or >7 drinks per week? (Optional):840743}    Alcohol Use 10/14/2016   Prescreen: >3 drinks/day or >7 drinks/week? The patient does not drink >3 drinks per day nor >7 drinks per week.   {add AUDIT responses (Optional) (A score of 7 for adult men is an indication of hazardous drinking; a score of 8 or more is an indication of an alcohol use disorder.  A score of 7 or more for adult women is an indication of hazardous drinking or an alchohol use disorder):158914}    Last PSA:   PSA   Date Value Ref Range Status   10/14/2016 1.86 0 - 4 ug/L Final       Reviewed orders with patient. Reviewed health maintenance and updated orders accordingly - { :984193::\"Yes\"}  {Chronicprobdata (optional):932203}    Reviewed and updated as needed this visit by clinical staff         Reviewed and updated as needed this visit by Provider        {HISTORY OPTIONS (Optional):955214}    Review of Systems  {MALE ROS (Optional):739491::\"CONSTITUTIONAL: NEGATIVE for fever, chills, change in weight\",\"INTEGUMENTARY/SKIN: NEGATIVE for worrisome rashes, moles or lesions\",\"EYES: NEGATIVE for vision " "changes or irritation\",\"ENT: NEGATIVE for ear, mouth and throat problems\",\"RESP: NEGATIVE for significant cough or SOB\",\"CV: NEGATIVE for chest pain, palpitations or peripheral edema\",\"GI: NEGATIVE for nausea, abdominal pain, heartburn, or change in bowel habits\",\" male: negative for dysuria, hematuria, decreased urinary stream, erectile dysfunction, urethral discharge\",\"MUSCULOSKELETAL: NEGATIVE for significant arthralgias or myalgia\",\"NEURO: NEGATIVE for weakness, dizziness or paresthesias\",\"PSYCHIATRIC: NEGATIVE for changes in mood or affect\"}    OBJECTIVE:   There were no vitals taken for this visit.    Physical Exam  {Exam Choices (Optional):329058}    {Diagnostic Test Results (Optional):345626::\"Diagnostic Test Results:\",\"Labs reviewed in Epic\"}    ASSESSMENT/PLAN:   {Diag Picklist:220035}    COUNSELING:   {MALE COUNSELING MESSAGES:699930::\"Reviewed preventive health counseling, as reflected in patient instructions\"}    Estimated body mass index is 27.01 kg/m  as calculated from the following:    Height as of 4/10/17: 1.867 m (6' 1.5\").    Weight as of 4/10/17: 94.1 kg (207 lb 8 oz).     {Weight Management Plan (ACO) Complete if BMI is abnormal-  Ages 18-64  BMI >24.9.  Age 65+ with BMI <23 or >30 (Optional):046283}     reports that he has never smoked. He has never used smokeless tobacco.  {Tobacco Cessation -- Complete if patient is a smoker (Optional):996989}    Counseling Resources:  ATP IV Guidelines  Pooled Cohorts Equation Calculator  FRAX Risk Assessment  ICSI Preventive Guidelines  Dietary Guidelines for Americans, 2010  USDA's MyPlate  ASA Prophylaxis  Lung CA Screening    RENNY JIN MD  [facility]  "

## 2019-07-26 NOTE — PROGRESS NOTES
Subjective     Marcell Kat is a 54 year old male who presents to clinic today for the following health issues:    HPI     BENIGN PROSTATIC HYPERTROPHY SYMPTOMS     WOULD LIKE ANOTHER CHECK UP    TEA DRINKING DAILY TWICE DAILY PER DAY     PROSTATE SPECIFIC ANTIGEN SCREENING IS RECOMMENDED FOR THIS     GLASSES WORKING WELL FOR HIM     HEARING OCCASIONAL WAX ISSUES     OCCASIONAL TINNITUS BOTH SIDES     SEASONAL ALLERGIC RHINITIS WITHIN NORMAL LIMITS     SENSE OF SMELL WITHIN NORMAL LIMITS     TEETH AND GUMS SENSITIVE TO COLD AND HEAT     OCCASIONAL DENTAL PAIN     MIGRAINE HEADACHES NOT TOO BAD     PERSISTENT PAINS  TAKING TABLETS     NECK WITHIN NORMAL LIMITS     CHEST  WITHIN NORMAL LIMITS     UPPER RESPIRATORY INFECTION GETTING BETTER AFTER 3 WEEKS    NO RACING OR SKIPPING HEART     OCCASIONAL SWELLING IN LEGS     RIGHT FOOT INTERDIGITAL FUNGUS     NON SMOKER     NO ASTHMA OR BRONCHITIS     NO BREAST LUMPS OR MASSES     NO HISTORY OF GALLBLADDER ISSUES     OCCASIONAL GASTROESOPHAGEAL REFLUX DISEASE WITHOUT ESOPHAGITIS BEANS     NO PANCREAS PROBLEMS     NO SPLEEN    NO CONSTIPATION OR DIARRHEA     OCCASIONAL LOWER BACK PAIN MILD INTERMITTENT     OCCASIONAL KNEE PAIN AND ANKLE PAINS AFTER LONG WALKS     NO SIGNIFICANT SKIN CONCERNS     Allergies  Meds  Problems  Med Hx  Surg Hx             SUBJECTIVE:   CC: Marcell Kat is an 54 year old male who presents for preventive health visit.     Healthy Habits:    Do you get at least three servings of calcium containing foods daily (dairy, green leafy vegetables, etc.)? no, taking calcium and/or vitamin D supplement: no    Amount of exercise or daily activities, outside of work: 1 day(s) per week    Problems taking medications regularly not applicable    Medication side effects: No    Have you had an eye exam in the past two years? yes    Do you see a dentist twice per year? no    Do you have sleep apnea, excessive snoring or daytime drowsiness?no          Today's  PHQ-2 Score:   PHQ-2 ( 1999 Pfizer) 7/26/2019 10/14/2016   Q1: Little interest or pleasure in doing things 0 0   Q2: Feeling down, depressed or hopeless 0 0   PHQ-2 Score 0 0       Abuse: Current or Past(Physical, Sexual or Emotional)- No  Do you feel safe in your environment? Yes    Social History     Tobacco Use     Smoking status: Never Smoker     Smokeless tobacco: Never Used   Substance Use Topics     Alcohol use: No     If you drink alcohol do you typically have >3 drinks per day or >7 drinks per week? Yes - AUDIT SCORE:     No flowsheet data found.                      Last PSA:   PSA   Date Value Ref Range Status   10/14/2016 1.86 0 - 4 ug/L Final       Reviewed orders with patient. Reviewed health maintenance and updated orders accordingly - Yes    Lab work is in process  Labs reviewed in EPIC  BP Readings from Last 3 Encounters:   07/26/19 112/60   04/10/17 116/62   10/14/16 124/76    Wt Readings from Last 3 Encounters:   07/26/19 92.5 kg (204 lb)   04/10/17 94.1 kg (207 lb 8 oz)   10/14/16 91.5 kg (201 lb 12.8 oz)                  Patient Active Problem List   Diagnosis     Vitamin D insufficiency     Knee pain, right     Pain in right testicle     Headache     Male infertility     Special screening for malignant neoplasm of prostate     Hyperlipidemia LDL goal <100     Lateral epicondylitis of right elbow     History reviewed. No pertinent surgical history.    Social History     Tobacco Use     Smoking status: Never Smoker     Smokeless tobacco: Never Used   Substance Use Topics     Alcohol use: No     Family History   Problem Relation Age of Onset     Genitourinary Problems Mother      Diabetes Brother          Current Outpatient Medications   Medication Sig Dispense Refill     diclofenac (VOLTAREN) 1 % topical gel Apply 4 grams to knees or 2 grams to hands four times daily using enclosed dosing card. 100 g 11     order for DME Equipment being ordered:  TOE SEPARATOR 4TH 5TH TOE  [facility]  "Equipment  Address: [address] [place] [id number]  [phone]  Hours:  Open today   8:00 AM - 5:00 PM 1 each 1     No Known Allergies  Recent Labs   Lab Test 10/14/16  0921 08/15/14  1949 05/29/14  1701 09/05/13  1013   *  --   --  112   HDL 54  --   --  37*   TRIG 120  --   --  155*   ALT 43 35 54 100*   CR 0.97 0.98  --  1.00   GFRESTIMATED 82 82  --  80   GFRESTBLACK >90 >90  African American GFR Calc    --  >90   POTASSIUM 4.1 4.0  --  4.0   TSH  --   --   --  2.33        Reviewed and updated as needed this visit by clinical staff  Tobacco  Allergies  Meds  Problems  Med Hx  Surg Hx  Fam Hx  Soc Hx          Reviewed and updated as needed this visit by Provider  Allergies  Meds  Problems  Med Hx  Surg Hx        Past Medical History:   Diagnosis Date     Arthritis      one year     Headache(784.0) 8/14/2014     Male infertility 7/1/2015     Pain in right testicle 9/5/2013    varicocoel       History reviewed. No pertinent surgical history.    ROS:  CONSTITUTIONAL: NEGATIVE for fever, chills, change in weight  INTEGUMENTARY/SKIN: NEGATIVE for worrisome rashes, moles or lesions  EYES: NEGATIVE for vision changes or irritation  ENT: NEGATIVE for ear, mouth and throat problems  RESP: NEGATIVE for significant cough or SOB  CV: NEGATIVE for chest pain, palpitations or peripheral edema  GI: NEGATIVE for nausea, abdominal pain, heartburn, or change in bowel habits   male: negative for dysuria, hematuria, decreased urinary stream, erectile dysfunction, urethral discharge  MUSCULOSKELETAL: NEGATIVE for significant arthralgias or myalgia  NEURO:  MILD MIGRAINES ASYMPTOMATIC TODAY   PSYCHIATRIC: NEGATIVE for changes in mood or affect    OBJECTIVE:   /60   Pulse 62   Temp 96  F (35.6  C) (Tympanic)   Resp 16   Ht 1.867 m (6' 1.5\")   Wt 92.5 kg (204 lb)   SpO2 99%   BMI 26.55 kg/m    EXAM:  GENERAL: healthy, alert and no distress  EYES: Eyes grossly normal to inspection, PERRL " and conjunctivae and sclerae normal  HENT: ear canals and TM's normal, nose and mouth without ulcers or lesions  NECK: no adenopathy, no asymmetry, masses, or scars and thyroid normal to palpation  RESP: lungs clear to auscultation - no rales, rhonchi or wheezes  CV: regular rate and rhythm, normal S1 S2, no S3 or S4, no murmur, click or rub, no peripheral edema and peripheral pulses strong  ABDOMEN: soft, nontender, no hepatosplenomegaly, no masses and bowel sounds normal   (male): normal male genitalia without lesions or urethral discharge, no hernia  MS: no gross musculoskeletal defects noted, no edema  SKIN: no suspicious lesions or rashes  NEURO: Normal strength and tone, mentation intact and speech normal  PSYCH: mentation appears normal, affect normal/bright  LYMPH: no cervical, supraclavicular, axillary, or inguinal adenopathy  Diabetic foot exam: normal DP and PT pulses, no trophic changes or ulcerative lesions, normal sensory exam, normal monofilament exam and  YEAST INFECTION 4T 4TH 5TH INTERSPACE    Diagnostic Test Results:  Labs reviewed in Epic  Results for orders placed or performed in visit on 07/26/19   UA reflex to Microscopic and Culture   Result Value Ref Range    Color Urine Yellow     Appearance Urine Clear     Glucose Urine Negative NEG^Negative mg/dL    Bilirubin Urine Negative NEG^Negative    Ketones Urine Negative NEG^Negative mg/dL    Specific Gravity Urine 1.015 1.003 - 1.035    Blood Urine Negative NEG^Negative    pH Urine 7.5 (H) 5.0 - 7.0 pH    Protein Albumin Urine Negative NEG^Negative mg/dL    Urobilinogen Urine 0.2 0.2 - 1.0 EU/dL    Nitrite Urine Negative NEG^Negative    Leukocyte Esterase Urine Negative NEG^Negative    Source Midstream Urine        ASSESSMENT/PLAN:       ICD-10-CM    1. Routine general medical examination at a health care facility Z00.00 Basic metabolic panel   2. Tinea pedis of right foot B35.3 order for DME     DISCONTINUED: clotrimazole (LOTRIMIN) 1 %  "external cream   3. Hyperlipidemia LDL goal <100 E78.5 Lipid panel reflex to direct LDL Fasting     ALT   4. Dysuria R30.0 UA reflex to Microscopic and Culture   5. Screening PSA (prostate specific antigen) Z12.5 Prostate spec antigen screen   6. Primary osteoarthritis of both knees M17.0 diclofenac (VOLTAREN) 1 % topical gel   7. Headache R51    8. Corns and callosities L84    9. Intractable tension-type headache, unspecified chronicity pattern G44.201    10. Neck pain M54.2    11. Chronic bilateral low back pain with left-sided sciatica M54.42     G89.29        COUNSELING:  Reviewed preventive health counseling, as reflected in patient instructions       Regular exercise       Healthy diet/nutrition       Vision screening       Hearing screening    Estimated body mass index is 26.55 kg/m  as calculated from the following:    Height as of this encounter: 1.867 m (6' 1.5\").    Weight as of this encounter: 92.5 kg (204 lb).         reports that he has never smoked. He has never used smokeless tobacco.      Counseling Resources:  ATP IV Guidelines  Pooled Cohorts Equation Calculator  FRAX Risk Assessment  ICSI Preventive Guidelines  Dietary Guidelines for Americans, 2010  [employer] MyPlate  ASA Prophylaxis  Lung CA Screening    RENNY JIN MD  [facility]  "

## 2019-10-09 ENCOUNTER — OFFICE VISIT (OUTPATIENT)
Dept: FAMILY MEDICINE | Facility: CLINIC | Age: 54
End: 2019-10-09
Payer: COMMERCIAL

## 2019-10-09 VITALS
DIASTOLIC BLOOD PRESSURE: 60 MMHG | HEIGHT: 74 IN | HEART RATE: 73 BPM | WEIGHT: 203.5 LBS | BODY MASS INDEX: 26.12 KG/M2 | SYSTOLIC BLOOD PRESSURE: 120 MMHG | TEMPERATURE: 96.8 F | OXYGEN SATURATION: 99 %

## 2019-10-09 DIAGNOSIS — R39.11 BENIGN PROSTATIC HYPERPLASIA WITH URINARY HESITANCY: ICD-10-CM

## 2019-10-09 DIAGNOSIS — R30.0 DYSURIA: ICD-10-CM

## 2019-10-09 DIAGNOSIS — N45.1 EPIDIDYMITIS: ICD-10-CM

## 2019-10-09 DIAGNOSIS — N50.811 PAIN IN RIGHT TESTICLE: Primary | ICD-10-CM

## 2019-10-09 DIAGNOSIS — N40.1 BENIGN PROSTATIC HYPERPLASIA WITH URINARY HESITANCY: ICD-10-CM

## 2019-10-09 LAB
ALBUMIN UR-MCNC: NEGATIVE MG/DL
APPEARANCE UR: CLEAR
BILIRUB UR QL STRIP: NEGATIVE
COLOR UR AUTO: YELLOW
GLUCOSE UR STRIP-MCNC: NEGATIVE MG/DL
HGB UR QL STRIP: ABNORMAL
KETONES UR STRIP-MCNC: NEGATIVE MG/DL
LEUKOCYTE ESTERASE UR QL STRIP: NEGATIVE
NITRATE UR QL: NEGATIVE
PH UR STRIP: 5 PH (ref 5–7)
RBC #/AREA URNS AUTO: NORMAL /HPF
SOURCE: ABNORMAL
SP GR UR STRIP: 1.02 (ref 1–1.03)
UROBILINOGEN UR STRIP-ACNC: 0.2 EU/DL (ref 0.2–1)
WBC #/AREA URNS AUTO: NORMAL /HPF

## 2019-10-09 PROCEDURE — 81001 URINALYSIS AUTO W/SCOPE: CPT | Performed by: FAMILY MEDICINE

## 2019-10-09 PROCEDURE — 99213 OFFICE O/P EST LOW 20 MIN: CPT | Performed by: FAMILY MEDICINE

## 2019-10-09 RX ORDER — CIPROFLOXACIN 500 MG/1
500 TABLET, FILM COATED ORAL 2 TIMES DAILY
Qty: 28 TABLET | Refills: 0 | Status: SHIPPED | OUTPATIENT
Start: 2019-10-09

## 2019-10-09 RX ORDER — TAMSULOSIN HYDROCHLORIDE 0.4 MG/1
0.4 CAPSULE ORAL DAILY
Qty: 30 CAPSULE | Refills: 1 | Status: SHIPPED | OUTPATIENT
Start: 2019-10-09 | End: 2019-12-27

## 2019-10-09 ASSESSMENT — MIFFLIN-ST. JEOR: SCORE: 1832.82

## 2019-10-09 NOTE — PROGRESS NOTES
"Subjective     Marcell Kat is a 54 year old male who presents to clinic today for the following health issues:    HPI   Testicle/prostate      Duration: years    Description (location/character/radiation): right side    Intensity:  mild, moderate    Accompanying signs and symptoms: hot, pain    History (similar episodes/previous evaluation): prostate medication in past    Precipitating or alleviating factors: None    Therapies tried and outcome: medication in 2008     Pt notes more pain in Rt groin, area above testicle on Rt side  Symptoms sometimes worse with intercourse    Urine stream sometimes burning    He denies pain with ejaculation         BP Readings from Last 3 Encounters:   10/09/19 120/60   07/26/19 112/60   04/10/17 116/62    Wt Readings from Last 3 Encounters:   10/09/19 92.3 kg (203 lb 8 oz)   07/26/19 92.5 kg (204 lb)   04/10/17 94.1 kg (207 lb 8 oz)                    Reviewed and updated as needed this visit by Provider         Review of Systems   ROS COMP: CONSTITUTIONAL: NEGATIVE for fever, chills, change in weight  ENT/MOUTH: NEGATIVE for ear, mouth and throat problems  RESP: NEGATIVE for significant cough or SOB  CV: NEGATIVE for chest pain, palpitations or peripheral edema  : positive for, dysuria, hesitancy and testicular pain Rt side      Objective    /60 (Cuff Size: Adult Large)   Pulse 73   Temp 96.8  F (36  C) (Tympanic)   Ht 1.88 m (6' 2\")   Wt 92.3 kg (203 lb 8 oz)   SpO2 99%   BMI 26.13 kg/m    Body mass index is 26.13 kg/m .  Physical Exam   GENERAL: healthy, alert and no distress  NECK: no adenopathy, no asymmetry, masses, or scars and thyroid normal to palpation  RESP: lungs clear to auscultation - no rales, rhonchi or wheezes  CV: regular rate and rhythm, normal S1 S2, no S3 or S4, no murmur, click or rub, no peripheral edema and peripheral pulses strong  ABDOMEN: soft, nontender, no hepatosplenomegaly, no masses and bowel sounds normal  RECTAL: prostate 2+ " "enlarged, no nodules. No tenderness to exam   (male): testicles normal without atrophy or masses, no hernias, penis normal without urethral discharge and tenderness to palpation Rt epididymal area, no point tenderness    Diagnostic Test Results:  Labs reviewed in Epic  Urinalysis - in process        Assessment & Plan     Marcell was seen today for prostate problem.    Diagnoses and all orders for this visit:    Pain in right testicle    Epididymitis  -     ciprofloxacin (CIPRO) 500 MG tablet; Take 1 tablet (500 mg) by mouth 2 times daily  -     *UA reflex to Microscopic and Culture ([facility] (except [place] and [place])    Benign prostatic hyperplasia with urinary hesitancy  -     tamsulosin (FLOMAX) 0.4 MG capsule; Take 1 capsule (0.4 mg) by mouth daily    Dysuria  -     *UA reflex to Microscopic and Culture ([facility] (except [place] and [place])         BMI:   Estimated body mass index is 26.13 kg/m  as calculated from the following:    Height as of this encounter: 1.88 m (6' 2\").    Weight as of this encounter: 92.3 kg (203 lb 8 oz).           FUTURE APPOINTMENTS:       - Follow-up visit in 1 mo if not improving  Patient Instructions   Push fluids      Return in about 1 month (around 11/9/2019), or if symptoms worsen or fail to improve, for epididymitis symptoms.    Shayne Dave MD  [facility]    "

## 2019-12-26 DIAGNOSIS — N40.1 BENIGN PROSTATIC HYPERPLASIA WITH URINARY HESITANCY: ICD-10-CM

## 2019-12-26 DIAGNOSIS — R39.11 BENIGN PROSTATIC HYPERPLASIA WITH URINARY HESITANCY: ICD-10-CM

## 2019-12-27 RX ORDER — TAMSULOSIN HYDROCHLORIDE 0.4 MG/1
CAPSULE ORAL
Qty: 30 CAPSULE | Refills: 6 | Status: SHIPPED | OUTPATIENT
Start: 2019-12-27

## 2019-12-27 NOTE — TELEPHONE ENCOUNTER
Prescription approved per [facility] Refill Protocol for 12 months of refills since last appointment, which was  07/26/19

## 2019-12-27 NOTE — TELEPHONE ENCOUNTER
"Requested Prescriptions   Pending Prescriptions Disp Refills     tamsulosin (FLOMAX) 0.4 MG capsule [Pharmacy Med Name: TAMSULOSIN HCL 0.4 MG CAPSULE] 30 capsule 1     Sig: TAKE 1 CAPSULE BY MOUTH EVERY DAY  Last Written Prescription Date:  10/9/19  Last Fill Quantity: 30 cap,  # refills: 1   Last office visit: 10/9/2019 with prescribing provider:  Jolie   Future Office Visit:         Alpha Blockers Passed - 12/26/2019  5:11 PM        Passed - Blood pressure under 140/90 in past 12 months     BP Readings from Last 3 Encounters:   10/09/19 120/60   07/26/19 112/60   04/10/17 116/62                 Passed - Recent (12 mo) or future (30 days) visit within the authorizing provider's specialty     Patient has had an office visit with the authorizing provider or a provider within the authorizing providers department within the previous 12 mos or has a future within next 30 days. See \"Patient Info\" tab in inbasket, or \"Choose Columns\" in Meds & Orders section of the refill encounter.              Passed - Patient does not have Tadalafil, Vardenafil, or Sildenafil on their medication list        Passed - Medication is active on med list        Passed - Patient is 18 years of age or older           "

## 2020-03-02 ENCOUNTER — HEALTH MAINTENANCE LETTER (OUTPATIENT)
Age: 55
End: 2020-03-02

## 2020-12-20 ENCOUNTER — HEALTH MAINTENANCE LETTER (OUTPATIENT)
Age: 55
End: 2020-12-20

## 2021-10-03 ENCOUNTER — HEALTH MAINTENANCE LETTER (OUTPATIENT)
Age: 56
End: 2021-10-03

## 2022-01-23 ENCOUNTER — HEALTH MAINTENANCE LETTER (OUTPATIENT)
Age: 57
End: 2022-01-23

## 2022-09-04 ENCOUNTER — HEALTH MAINTENANCE LETTER (OUTPATIENT)
Age: 57
End: 2022-09-04

## 2023-04-30 ENCOUNTER — HEALTH MAINTENANCE LETTER (OUTPATIENT)
Age: 58
End: 2023-04-30